# Patient Record
Sex: FEMALE | Race: WHITE | Employment: OTHER | URBAN - METROPOLITAN AREA
[De-identification: names, ages, dates, MRNs, and addresses within clinical notes are randomized per-mention and may not be internally consistent; named-entity substitution may affect disease eponyms.]

---

## 2017-06-20 ENCOUNTER — HOSPITAL ENCOUNTER (OUTPATIENT)
Dept: SURGERY | Age: 67
Discharge: HOME | End: 2017-06-20
Attending: INTERNAL MEDICINE | Admitting: INTERNAL MEDICINE

## 2017-06-20 VITALS
HEART RATE: 52 BPM | HEIGHT: 66 IN | WEIGHT: 165 LBS | SYSTOLIC BLOOD PRESSURE: 121 MMHG | RESPIRATION RATE: 16 BRPM | TEMPERATURE: 98.8 F | OXYGEN SATURATION: 94 % | BODY MASS INDEX: 26.52 KG/M2 | DIASTOLIC BLOOD PRESSURE: 72 MMHG

## 2017-06-20 DIAGNOSIS — Z86.010 HISTORY OF COLONIC POLYPS: ICD-10-CM

## 2017-06-20 RX ORDER — ATENOLOL 50 MG/1
100 TABLET ORAL DAILY
COMMUNITY

## 2017-06-20 RX ORDER — LIDOCAINE HYDROCHLORIDE 10 MG/ML
0.1 INJECTION, SOLUTION EPIDURAL; INFILTRATION; INTRACAUDAL; PERINEURAL
Status: ACTIVE | OUTPATIENT
Start: 2017-06-20 | End: 2017-06-20

## 2017-06-20 RX ORDER — SODIUM CHLORIDE, SODIUM LACTATE, POTASSIUM CHLORIDE, CALCIUM CHLORIDE 600; 310; 30; 20 MG/100ML; MG/100ML; MG/100ML; MG/100ML
INJECTION, SOLUTION INTRAVENOUS ONCE
Status: COMPLETED | OUTPATIENT
Start: 2017-06-20 | End: 2017-06-20

## 2017-06-20 RX ORDER — VALSARTAN 320 MG/1
320 TABLET ORAL DAILY
COMMUNITY
End: 2019-04-11

## 2017-06-20 RX ADMIN — SODIUM CHLORIDE, SODIUM LACTATE, POTASSIUM CHLORIDE, CALCIUM CHLORIDE: 600; 310; 30; 20 INJECTION, SOLUTION INTRAVENOUS at 08:51

## 2017-06-20 ASSESSMENT — PAIN SCALES - GENERAL
PAINLEVEL_OUTOF10: 0
PAINLEVEL_OUTOF10: 0

## 2017-06-20 ASSESSMENT — PAIN - FUNCTIONAL ASSESSMENT: PAIN_FUNCTIONAL_ASSESSMENT: 0-10

## 2018-02-16 ENCOUNTER — OFFICE VISIT (OUTPATIENT)
Dept: ORTHOPEDIC SURGERY | Age: 68
End: 2018-02-16

## 2018-02-16 VITALS
WEIGHT: 162 LBS | HEART RATE: 57 BPM | DIASTOLIC BLOOD PRESSURE: 90 MMHG | HEIGHT: 66 IN | SYSTOLIC BLOOD PRESSURE: 151 MMHG | BODY MASS INDEX: 26.03 KG/M2

## 2018-02-16 DIAGNOSIS — S76.011A STRAIN OF GLUTEUS MEDIUS, RIGHT, INITIAL ENCOUNTER: ICD-10-CM

## 2018-02-16 DIAGNOSIS — M25.551 RIGHT HIP PAIN: Primary | ICD-10-CM

## 2018-02-16 PROCEDURE — G8419 CALC BMI OUT NRM PARAM NOF/U: HCPCS | Performed by: ORTHOPAEDIC SURGERY

## 2018-02-16 PROCEDURE — G8400 PT W/DXA NO RESULTS DOC: HCPCS | Performed by: ORTHOPAEDIC SURGERY

## 2018-02-16 PROCEDURE — 1123F ACP DISCUSS/DSCN MKR DOCD: CPT | Performed by: ORTHOPAEDIC SURGERY

## 2018-02-16 PROCEDURE — 1090F PRES/ABSN URINE INCON ASSESS: CPT | Performed by: ORTHOPAEDIC SURGERY

## 2018-02-16 PROCEDURE — G8484 FLU IMMUNIZE NO ADMIN: HCPCS | Performed by: ORTHOPAEDIC SURGERY

## 2018-02-16 PROCEDURE — 4040F PNEUMOC VAC/ADMIN/RCVD: CPT | Performed by: ORTHOPAEDIC SURGERY

## 2018-02-16 PROCEDURE — 3014F SCREEN MAMMO DOC REV: CPT | Performed by: ORTHOPAEDIC SURGERY

## 2018-02-16 PROCEDURE — 3017F COLORECTAL CA SCREEN DOC REV: CPT | Performed by: ORTHOPAEDIC SURGERY

## 2018-02-16 PROCEDURE — 1036F TOBACCO NON-USER: CPT | Performed by: ORTHOPAEDIC SURGERY

## 2018-02-16 PROCEDURE — 99203 OFFICE O/P NEW LOW 30 MIN: CPT | Performed by: ORTHOPAEDIC SURGERY

## 2018-02-16 PROCEDURE — G8427 DOCREV CUR MEDS BY ELIG CLIN: HCPCS | Performed by: ORTHOPAEDIC SURGERY

## 2018-02-16 NOTE — PROGRESS NOTES
patient's allergies indicates no known allergies. Medications:  Outpatient Prescriptions Marked as Taking for the 2/16/18 encounter (Office Visit) with Kendrick Lewis MD   Medication Sig Dispense Refill    valsartan (DIOVAN) 320 MG tablet Take 320 mg by mouth daily      atenolol (TENORMIN) 50 MG tablet Take 50 mg by mouth daily      Ascorbic Acid (VITAMIN C) 500 MG tablet Take 500 mg by mouth daily.  fish oil-omega-3 fatty acids 1000 MG capsule Take 2 g by mouth daily.  calcium carbonate (TUMS) 500 MG chewable tablet Take 1 tablet by mouth daily.  aspirin 81 MG EC tablet Take 81 mg by mouth daily.  Multiple Vitamins-Minerals (MULTI FOR HER 50+ PO) Take 1 tablet by mouth daily.  vitamin D (CHOLECALCIFEROL) 400 UNIT TABS tablet Take 400 Units by mouth daily.  Olmesartan Medoxomil (BENICAR PO) Take 40 mg by mouth daily. Social History     Social History    Marital status:      Spouse name: N/A    Number of children: 0    Years of education: N/A     Occupational History          Social History Main Topics    Smoking status: Never Smoker    Smokeless tobacco: Never Used    Alcohol use 0.5 oz/week     1 Standard drinks or equivalent per week    Drug use: No    Sexual activity: Not on file     Other Topics Concern    Not on file     Social History Narrative    No narrative on file     Family History   Problem Relation Age of Onset    Cancer Father        Review of Systems:    [employer] reported review of systems has been reviewed from 02/16/18 and is in her medical record. The scanned image can be found in media images folder. She was instructed to contact her primary care physician regarding ROS positives if not already being addressed during today's visit.     Objective:   Physical Exam  Vital Signs:  BP (!) 151/90   Pulse 57   Ht 5' 6\" (1.676 m)   Wt 162 lb (73.5 kg)   BMI 26.15 kg/m²     Constitution:  Generally, athletic pubalgia   [] Resisted curl up for athletic pubalgia     Motor Function:  [x] No gross motor weakness of hip [x] No gross motor weakness of knee  [x] No gross motor weakness of ankle    [x] No gross motor weakness of great toe    [] Motor strength:   [x] Hip Flex [x] 5/5 [] 4/5 [] 3/5 [] 2/5 [] 1/5 [] 0/5   [x] Hip ABductors [x] 5/5 [] 4/5 [] 3/5 [] 2/5 [] 1/5 [] 0/5   [x] Hip ADductors [x] 5/5 [] 4/5 [] 3/5 [] 2/5 [] 1/5 [] 0/5     Neurologic:   [x] Sensation to light touch intact  [x] Coordination / proprioception intact  Motor function intact L2-S1    Circulation:  [x] The limb is warm and well perfused. [x] Capillary refill is intact. [] Edema:  [x] none  [] mild  [] moderate  [] severe     Assessment of Joint Laxity:    Beighton hypermobility score (0-9): 0  [] Small fingers passively able to extend beyond 90 degrees (2 pts)   [] Thumbs passively able to flex to flexor aspect of forearm (2 pts)   [] Elbows hyperextend beyond 10 degrees (2 pts)   [] Knees hyperextend beyond 10 degrees (2 pts)   [] Can rest palms on floor with forward flexion of trunk with knees extended (1 pt)          RIGHT HIP ORTHOPAEDIC  EXAM:  Inspection:  [x] Skin intact without abrasion, lacerations or rashes  [x] Leg lengths equal  [] Ecchymosis:  [x] none  [] mild  [] moderate  [] severe   [] Atrophy:  [x] none  [] mild  [] moderate  [] severe      Range of Motion:  [x] No flexion contracture         [] Deferred: acute injury/post-surgery/pain  [] Flexion contracture     Forward flexion: 110  Supine Internal rotation: 25  Supine External rotation: 65  Abduction: 50  Adduction: 30      Palpation:   Tenderness over the gluteal musculature and posterior flare of the greater trochanter. Pain with resisted abduction. Negative trendelenburg gait and sign.     Provocative Tests:  [x] Negative  Positive Tests:  [] Log Roll   [] Anabela Test: ITB Tightness   [] FADIR Anterior impingement:    [] Posterior Impingement    [] Shuck test for symptoms appear prior to the next scheduled visit. She is specifically instructed to contact the office between now & her scheduled appointment if she has concerns related to her condition or if she needs assistance in scheduling the above tests. She is welcome to call for an appointment sooner if she has any additional concerns or questions. Patient Education Materials Provided:  [x] Dr Ronaldo Greenfield: New patient folder,  Anatomic Drawings and treatment algorithms    There are no Patient Instructions on file for this visit. Orders Placed This Encounter   Procedures    XR HIP RIGHT (2-3 VIEWS)     [id number]     Order Specific Question:   Reason for exam:     Answer:   Pain, ap and dun, room 4    [facility]     Referral Priority:   Routine     Referral Type:   Eval and Treat     Referral Reason:   Specialty Services Required     Requested Specialty:   Physical Therapy     Number of Visits Requested:   1       Refills/New Prescriptions:  No orders of the defined types were placed in this encounter. Today's prescription medications will be e-scribed (when appropriate) to the Patient's Preferred Pharmacy:   [place] [address]  Phone: [phone] Fax: [phone]    [facility] [address] [place] [address], [address]  [address] [place] [address]  Phone: [phone] Fax: [phone]       Karen Blount MD  Clinical Fellow in Sports Medicine  Board Certified Orthopaedic Surgeon  [facility]    I attest that my visit today with Devon Carlson was supervised by Dr Tim Navarro:  ([place] [address] [phone])  [facility] main number: use after hours [phone])    This dictation was performed with a verbal recognition program (DRAGON) and it was checked for errors.   It is possible that there are still dictated errors within this office note. If so, please bring any errors to my attention for an addendum. All efforts were made to ensure that this office note is accurate. Attending Attestation Note:    I, Dr. Aggie Montgomery MD, personally performed the services described in this documentation as scribed by Dr. Beckie rGay M.D. in my presence, and it is both accurate and complete.       Aggie Montgomery MD  Orthopaedic Surgeon, Sports Medicine  Director, Hip Arthroscopy and [address] and [employer] [place] () - [phone]

## 2018-02-16 NOTE — LETTER
Hip Rehabilitation Referral    Patient Name: Apoorva Diaz      YOB: 1950    Diagnosis: Right Abductor strain / Hip pain     Precautions: N/A    Evaluate and Treat          Stretching and soft tissue mobs:  Strengthening:   IT Band      Core stabilization   Adductors      Glute eccentric progressing to concentric   Hip Flexors      Pelvic and trunk strengthening   Gluteals      Abductors      Iliopsoas complex     Flexors   Rectus femoris      Progressive resistive exercises   TFL         Sartorius                     Activities:        Kinematic Retraining        Activity modification      Patient education to avoid continued irritation with ADLs   Normalization of gait    Modalities:     Return to Sport:   Ultrasound      Plyometrics   Iontophoresis     Rhythmic stabilization   Moist heat      Core strengthening    Massage      Sports specific program:       Cryotherapy       Electrical stimulation      Paraffin   Whirlpool   TENS   Per therapist discretion   Home exercise program (copy to patient). Perform exercises for:  30    minutes   2- 3      times/day   Supervised physical therapy  Frequency:   1x week   2x week   3x week   Other:   Duration:  2 weeks    4 weeks   6 weeks   Other:     Additional Instructions:       Hip pain   Focus on core stabilization, glute & eccentric hip flexor strengthening progressing to concentric. Soft tissue mobs focus on hip adductors, rectus femoris, iliopsoas, TFL, & gluteals. Kinematic re-training and activity modification as indicated. Trochanteric Bursitis/Glutes Medius Tendinitis/tendinopathy:   Soft tissue mobs to Glutes,  Adductors,  ITB, primary and secondary hip flexors. Core stabilization, pelvic and trunk control exercise, Glute strengthening. Activity modification and Pt education to avoid continued irritation with ADLs. Normalize of gait. AVOID LONG LEVER SIDE or STRAIGHT LEG RAISE.

## 2018-02-21 ENCOUNTER — HOSPITAL ENCOUNTER (OUTPATIENT)
Dept: PHYSICAL THERAPY | Age: 68
Discharge: HOME | End: 2018-02-28
Admitting: ORTHOPAEDIC SURGERY

## 2018-02-21 NOTE — FLOWSHEET NOTE
bilaterally to allow for proper functional mobility as indicated by patients Functional Deficits. 4. Patient will return to walking and biking without increased symptoms or restriction. 5. Patient will be able to ascend/descend full flight of stairs with reciprocal gait pattern and no increase in symptoms. New or Updated Goals (if applicable):  [x] No change to goals established upon initial eval/last progress note:  New Goals:    Progression Towards Functional goals:   [] Patient is progressing as expected towards functional goals listed. [] Progression is slowed due to complexities listed. [] Progression has been slowed due to co-morbidities.   [x] Plan just implemented, too soon to assess goals progression  [] Other:     ASSESSMENT:    [] Improvement noted relative to goals:  [] No Improvement noted related to goals:  Summary/Patient's response to treatment: See Eval    Treatment/Activity Tolerance:  [x] Patient tolerated treatment well [] Patient limited by fatique  [] Patient limited by pain  [] Patient limited by other medical complications  [] Other:     Prognosis: [x] Good [] Fair  [] Poor    Patient Requires Follow-up: [x] Yes  [] No    PLAN: See eval  [] Continue per plan of care [] Alter current plan (see comments)  [x] Plan of care initiated [] Hold pending MD visit [] Discharge    Electronically signed by: Madeline Peñaloza PT

## 2018-02-21 NOTE — PLAN OF CARE
am and after sitting for long periods of time but loosens up as she moves more. Relevant Medical History:   Functional Disability Index:PT G-Codes  Functional Assessment Tool Used: LEFS  Score: 34%  Functional Limitation: Mobility: Walking and moving around  Mobility: Walking and Moving Around Current Status (): At least 20 percent but less than 40 percent impaired, limited or restricted  Mobility: Walking and Moving Around Goal Status ():  At least 1 percent but less than 20 percent impaired, limited or restricted    Pain Scale: 8 with hike, currently 6/10  Easing factors: rest, stretching  Provocative factors: stairs, hiking, increased activity     Type: [x]Constant   []Intermittent  []Radiating []Localized []other:     Numbness/Tingling: none    Occupation/School: recently retired    Living Status/Prior Level of Function: Independent with ADLs and IADLs, biking, exercise    OBJECTIVE:     ROM LEFT RIGHT   HIP Flex WNL WNL   HIP Abd     HIP Ext     HIP IR Equal bilaterally    HIP ER Equal bilaterally    Knee ext     Knee Flex     Ankle PF     Ankle DF     Ankle In     Ankle Ev     Strength  LEFT RIGHT   HIP Flexors 4- with pain 4+   HIP Abductors 4- with pain 4-   HIP Ext 4- with pain 4+   Hip ER     Knee EXT (quad)     Knee Flex (HS)     Ankle DF     Ankle PF     Ankle Inv     Ankle EV          Circumference  Mid apex  7 cm prox             Reflexes/Sensation:    []Dermatomes/Myotomes intact    [x]Reflexes equal and normal bilaterally   []Other:    Joint mobility:    [x]Normal    []Hypo   []Hyper    Palpation: TTP over glut med muscle belly, SI jt, glut med origin and insertion, slight TTP over lateral quad and ITB    Functional Mobility/Transfers: independent    Posture: slight decrease in WB on R LE , anterior pelvic tilt    Bandages/Dressings/Incisions: n/a    Gait: (include devices/WB status) Slight antalgic gait on R LE    Orthopedic Special Tests: negative christian, HS length equal bilaterally, negative scour                       [x] Patient history, allergies, meds reviewed. Medical chart reviewed. See intake form. Review Of Systems (ROS):  [x]Performed Review of systems (Integumentary, CardioPulmonary, Neurological) by intake and observation. Intake form has been scanned into medical record. Patient has been instructed to contact their primary care physician regarding ROS issues if not already being addressed at this time. Co-morbidities/Complexities (which will affect course of rehabilitation):   []None           Arthritic conditions   []Rheumatoid arthritis (M05.9)  [x]Osteoarthritis (M19.91)   Cardiovascular conditions   [x]Hypertension (I10)  []Hyperlipidemia (E78.5)  []Angina pectoris (I20)  []Atherosclerosis (I70)   Musculoskeletal conditions   []Disc pathology   []Congenital spine pathologies   []Prior surgical intervention  []Osteoporosis (M81.8)  []Osteopenia (M85.8)   Endocrine conditions   []Hypothyroid (E03.9)  []Hyperthyroid Gastrointestinal conditions   []Constipation (A26.26)   Metabolic conditions   []Morbid obesity (E66.01)  []Diabetes type 1(E10.65) or 2 (E11.65)   []Neuropathy (G60.9)     Pulmonary conditions   []Asthma (J45)  []Coughing   []COPD (J44.9)   Psychological Disorders  [x]Anxiety (F41.9)  [x]Depression (F32.9)   []Other:   []Other:          Barriers to/and or personal factors that will affect rehab potential:              [x]Age  []Sex              []Motivation/Lack of Motivation                        []Co-Morbidities              []Cognitive Function, education/learning barriers              []Environmental, home barriers              []profession/work barriers  []past PT/medical experience  []other:  Justification:    Falls Risk Assessment (30 days):   [x] Falls Risk assessed and no intervention required.   [] Falls Risk assessed and Patient requires intervention due to being higher risk   TUG score (>12s at risk):     [] Falls education provided, including

## 2018-02-28 ENCOUNTER — HOSPITAL ENCOUNTER (OUTPATIENT)
Dept: PHYSICAL THERAPY | Age: 68
Discharge: HOME | End: 2018-03-01

## 2018-02-28 NOTE — FLOWSHEET NOTE
HEP   Prone ext 2 x 10    BLAINE ext/ABD 2 x 10    Forward step ups 2 x 10    Glider back, diagonal 1 x 10                                  Manual Intervention     Hip PROM 2 min    STM/cross friction ITB, lateral quad, glut med, SI joint 8 min                        NMR re-education                                                      Therapeutic Exercise and NMR EXR  [x] (09701) Provided verbal/tactile cueing for activities related to strengthening, flexibility, endurance, ROM for improvements in LE, proximal hip, and core control with self care, mobility, lifting, ambulation.  [] (14586) Provided verbal/tactile cueing for activities related to improving balance, coordination, kinesthetic sense, posture, motor skill, proprioception  to assist with LE, proximal hip, and core control in self care, mobility, lifting, ambulation and eccentric single leg control.      NMR and Therapeutic Activities:    [x] (43640 or 25199) Provided verbal/tactile cueing for activities related to improving balance, coordination, kinesthetic sense, posture, motor skill, proprioception and motor activation to allow for proper function of core, proximal hip and LE with self care and ADLs  [] (53129) Gait Re-education- Provided training and instruction to the patient for proper LE, core and proximal hip recruitment and positioning and eccentric body weight control with ambulation re-education including up and down stairs     Home Exercise Program:    [x] (69173) Reviewed/Progressed HEP activities related to strengthening, flexibility, endurance, ROM of core, proximal hip and LE for functional self-care, mobility, lifting and ambulation/stair navigation   [] (01871)Reviewed/Progressed HEP activities related to improving balance, coordination, kinesthetic sense, posture, motor skill, proprioception of core, proximal hip and LE for self care, mobility, lifting, and ambulation/stair navigation      Manual Treatments:  PROM / STM /

## 2018-03-01 ENCOUNTER — HOSPITAL ENCOUNTER (OUTPATIENT)
Dept: PHYSICAL THERAPY | Age: 68
Discharge: HOME | End: 2018-03-31
Attending: ORTHOPAEDIC SURGERY | Admitting: ORTHOPAEDIC SURGERY

## 2018-03-07 ENCOUNTER — HOSPITAL ENCOUNTER (OUTPATIENT)
Dept: PHYSICAL THERAPY | Age: 68
Discharge: HOME | End: 2018-03-08

## 2018-03-07 NOTE — FLOWSHEET NOTE
[facility] [address] [facility], [address]  Phone: [phone]  Fax [phone]    Physical Therapy Daily Treatment Note  Date:  3/7/2018    Patient Name:  Rosina Boyd    :  1950  MRN: [id number]  Restrictions/Precautions:    Physician Information:  Referring Practitioner: Dr. Christen Jimenez  Medical/Treatment Diagnosis Information:  Diagnosis: R80.173M (ICD-10-CM) - Strain of gluteus medius, right, initial encounter  · Treatment Diagnosis:  Right hip pain M25.551,  Right hip stiffness M25.651,  Right LE weakness M62.81                                         [x] Conservative / [] Surgical - DOS:  Therapy Diagnosis/Practice Pattern:  Practice Pattern A: Primary Prevention  Insurance/Certification information:  PT Insurance Information: Medicare  Plan of care signed: [x] YES  [] NO  Number of Comorbidities:  []0     [x]1-2    []3+  Date of Patient follow up with Physician:     G-Code (if applicable):      Date G-Code Applied:         Progress Note: [x]  Yes  []  No  Next due by: Visit #10        Latex Allergy:  [x]NO      []YES  Preferred Language for Healthcare:   [x]English       []other:    Visit # Insurance Allowable Reporting Period   3 medicare Begin Date: 3/7/2018               End Date:      RECERT DUE BY:     SUBJECTIVE: Pt doing much better. Was able to go on walk for 45 mins one day and 60 mins another day with no increase in pain. Slowly working towards hills.     OBJECTIVE:   Observation:  Palpation: TTP over ITB, glut med, hip flexor     Test used Initial score Current Score   Pain Summary VAS     Functional questionnaire LEFS     ROM IR/ER  equal bilaterally     flexion WNL    Strength Hip flexors 4- with pain     ABD 4- with pain     ext 4- with pain         RESTRICTIONS/PRECAUTIONS: none    Exercises/Interventions:     Therapeutic Ex Sets/reps Notes   Supine piriformis stretch 3 x 30 HEP   ITB strap stretch 3 x 30

## 2018-03-21 ENCOUNTER — HOSPITAL ENCOUNTER (OUTPATIENT)
Dept: PHYSICAL THERAPY | Age: 68
Discharge: HOME | End: 2018-03-22

## 2018-03-21 NOTE — FLOWSHEET NOTE
[facility] [address] [facility], [address]  Phone: [phone]  Fax [phone]    Physical Therapy Daily Treatment Note  Date:  3/21/2018    Patient Name:  Christine Cooks    :  1950  MRN: [id number]  Restrictions/Precautions:    Physician Information:  Referring Practitioner: Dr. Taisha Perry  Medical/Treatment Diagnosis Information:  Diagnosis: N91.491J (ICD-10-CM) - Strain of gluteus medius, right, initial encounter  · Treatment Diagnosis:  Right hip pain M25.551,  Right hip stiffness M25.651,  Right LE weakness M62.81                                         [x] Conservative / [] Surgical - DOS:  Therapy Diagnosis/Practice Pattern:  Practice Pattern A: Primary Prevention  Insurance/Certification information:  PT Insurance Information: Medicare  Plan of care signed: [x] YES  [] NO  Number of Comorbidities:  []0     [x]1-2    []3+  Date of Patient follow up with Physician:     G-Code (if applicable):      Date G-Code Applied:         Progress Note: [x]  Yes  []  No  Next due by: Visit #10        Latex Allergy:  [x]NO      []YES  Preferred Language for Healthcare:   [x]English       []other:    Visit # Insurance Allowable Reporting Period   4 medicare Begin Date: 3/21/2018               End Date:      RECERT DUE BY:     SUBJECTIVE: Pt doing much better, went for 1 hour walk two times this week with no issues. Pt feeling much better, can still feel it a little bit if she stretches far enough. Going to try hiking this weekend.     OBJECTIVE:   Observation:  Palpation: TTP over ITB, glut med, hip flexor     Test used Initial score Current Score   Pain Summary VAS     Functional questionnaire LEFS     ROM IR/ER  equal bilaterally     flexion WNL    Strength Hip flexors 4- with pain     ABD 4- with pain     ext 4- with pain         RESTRICTIONS/PRECAUTIONS: none    Exercises/Interventions:     Therapeutic Ex Sets/reps Notes   Supine piriformis stretch 3 x 30 HEP   ITB strap stretch 3 x 30 HEP   LTR with SB 2 x 10 HEP   Bridges/on SB 1 x 10 HEP   Clamshells 2 x 10 OTB HEP   SLR 2 x 10  1.5 #HEP   Prone ext 2 x 10    BLAINE ext/ABD 2 x 10 45#   Forward step ups 2 x 10 6 inch with foam   Glider back, diagonal 1 x 10    SAQ/LAQ 2 x 10 Added to HEP 1.5#    Bike 5 min    Three ways band at ankle on box 15x Added to HEP [facility]                  Manual Intervention     Hip PROM 2 min    STM/cross friction ITB, lateral quad, glut med, SI joint 8 min                        NMR re-education                                                      Therapeutic Exercise and NMR EXR  [x] (19132) Provided verbal/tactile cueing for activities related to strengthening, flexibility, endurance, ROM for improvements in LE, proximal hip, and core control with self care, mobility, lifting, ambulation.  [] (20473) Provided verbal/tactile cueing for activities related to improving balance, coordination, kinesthetic sense, posture, motor skill, proprioception  to assist with LE, proximal hip, and core control in self care, mobility, lifting, ambulation and eccentric single leg control.      NMR and Therapeutic Activities:    [x] (72279 or 96384) Provided verbal/tactile cueing for activities related to improving balance, coordination, kinesthetic sense, posture, motor skill, proprioception and motor activation to allow for proper function of core, proximal hip and LE with self care and ADLs  [] (42424) Gait Re-education- Provided training and instruction to the patient for proper LE, core and proximal hip recruitment and positioning and eccentric body weight control with ambulation re-education including up and down stairs     Home Exercise Program:    [x] (83085) Reviewed/Progressed HEP activities related to strengthening, flexibility, endurance, ROM of core, proximal hip and LE for functional self-care, mobility, lifting and ambulation/stair navigation   [] strength and control, at least 4+ hip ABD bilaterally to allow for proper functional mobility as indicated by patients Functional Deficits. 4. Patient will return to walking and biking without increased symptoms or restriction. 5. Patient will be able to ascend/descend full flight of stairs with reciprocal gait pattern and no increase in symptoms. New or Updated Goals (if applicable):  [x] No change to goals established upon initial eval/last progress note:  New Goals:    Progression Towards Functional goals:   [x] Patient is progressing as expected towards functional goals listed. [] Progression is slowed due to complexities listed. [] Progression has been slowed due to co-morbidities.   [] Plan just implemented, too soon to assess goals progression  [] Other:     ASSESSMENT:    [] Improvement noted relative to goals:  [] No Improvement noted related to goals:  Summary/Patient's response to treatment: Improved mechanics with single leg activities    Treatment/Activity Tolerance:  [x] Patient tolerated treatment well [] Patient limited by fatique  [] Patient limited by pain  [] Patient limited by other medical complications  [] Other:     Prognosis: [x] Good [] Fair  [] Poor    Patient Requires Follow-up: [x] Yes  [] No    PLAN: See eval  [x] Continue per plan of care [] Alter current plan (see comments)  [] Plan of care initiated [] Hold pending MD visit [] Discharge    Electronically signed by: Monae Flores PT

## 2018-04-01 ENCOUNTER — HOSPITAL ENCOUNTER (OUTPATIENT)
Dept: PHYSICAL THERAPY | Age: 68
Discharge: HOME | End: 2018-04-30
Attending: ORTHOPAEDIC SURGERY | Admitting: ORTHOPAEDIC SURGERY

## 2018-04-04 ENCOUNTER — HOSPITAL ENCOUNTER (OUTPATIENT)
Dept: PHYSICAL THERAPY | Age: 68
Discharge: HOME | End: 2018-04-05

## 2018-05-01 ENCOUNTER — HOSPITAL ENCOUNTER (OUTPATIENT)
Dept: PHYSICAL THERAPY | Age: 68
Discharge: HOME | End: 2018-05-31
Attending: ORTHOPAEDIC SURGERY | Admitting: ORTHOPAEDIC SURGERY

## 2019-04-11 ENCOUNTER — HOSPITAL ENCOUNTER (EMERGENCY)
Age: 69
Discharge: HOME | End: 2019-04-11
Attending: EMERGENCY MEDICINE
Payer: MEDICARE

## 2019-04-11 ENCOUNTER — APPOINTMENT (OUTPATIENT)
Dept: GENERAL RADIOLOGY | Age: 69
End: 2019-04-11
Payer: MEDICARE

## 2019-04-11 ENCOUNTER — APPOINTMENT (OUTPATIENT)
Dept: CT IMAGING | Age: 69
End: 2019-04-11
Payer: MEDICARE

## 2019-04-11 VITALS
RESPIRATION RATE: 14 BRPM | WEIGHT: 157 LBS | HEART RATE: 64 BPM | TEMPERATURE: 99 F | HEIGHT: 66 IN | OXYGEN SATURATION: 100 % | SYSTOLIC BLOOD PRESSURE: 148 MMHG | BODY MASS INDEX: 25.23 KG/M2 | DIASTOLIC BLOOD PRESSURE: 88 MMHG

## 2019-04-11 DIAGNOSIS — M79.18 MUSCULOSKELETAL PAIN: ICD-10-CM

## 2019-04-11 DIAGNOSIS — M54.9 UPPER BACK PAIN ON LEFT SIDE: Primary | ICD-10-CM

## 2019-04-11 LAB
A/G RATIO: 1.9 (ref 1.1–2.2)
ALBUMIN SERPL-MCNC: 4.1 G/DL (ref 3.4–5)
ALP BLD-CCNC: 63 U/L (ref 40–129)
ALT SERPL-CCNC: 10 U/L (ref 10–40)
ANION GAP SERPL CALCULATED.3IONS-SCNC: 9 MMOL/L (ref 3–16)
AST SERPL-CCNC: 21 U/L (ref 15–37)
BASOPHILS ABSOLUTE: 0.1 K/UL (ref 0–0.2)
BASOPHILS RELATIVE PERCENT: 1.2 %
BILIRUB SERPL-MCNC: 0.4 MG/DL (ref 0–1)
BILIRUBIN URINE: NEGATIVE
BLOOD, URINE: NEGATIVE
BUN BLDV-MCNC: 14 MG/DL (ref 7–20)
CALCIUM SERPL-MCNC: 8.8 MG/DL (ref 8.3–10.6)
CHLORIDE BLD-SCNC: 96 MMOL/L (ref 99–110)
CLARITY: CLEAR
CO2: 29 MMOL/L (ref 21–32)
COLOR: YELLOW
CREAT SERPL-MCNC: 0.6 MG/DL (ref 0.6–1.2)
D DIMER: <200 NG/ML DDU (ref 0–229)
EKG ATRIAL RATE: 61 BPM
EKG DIAGNOSIS: NORMAL
EKG P AXIS: 48 DEGREES
EKG P-R INTERVAL: 180 MS
EKG Q-T INTERVAL: 428 MS
EKG QRS DURATION: 92 MS
EKG QTC CALCULATION (BAZETT): 430 MS
EKG R AXIS: 43 DEGREES
EKG T AXIS: 32 DEGREES
EKG VENTRICULAR RATE: 61 BPM
EOSINOPHILS ABSOLUTE: 0.3 K/UL (ref 0–0.6)
EOSINOPHILS RELATIVE PERCENT: 6.3 %
GFR AFRICAN AMERICAN: >60
GFR NON-AFRICAN AMERICAN: >60
GLOBULIN: 2.2 G/DL
GLUCOSE BLD-MCNC: 101 MG/DL (ref 70–99)
GLUCOSE URINE: NEGATIVE MG/DL
HCT VFR BLD CALC: 35.9 % (ref 36–48)
HEMOGLOBIN: 12.3 G/DL (ref 12–16)
KETONES, URINE: NEGATIVE MG/DL
LEUKOCYTE ESTERASE, URINE: NEGATIVE
LYMPHOCYTES ABSOLUTE: 1.3 K/UL (ref 1–5.1)
LYMPHOCYTES RELATIVE PERCENT: 24.6 %
MCH RBC QN AUTO: 30.3 PG (ref 26–34)
MCHC RBC AUTO-ENTMCNC: 34.2 G/DL (ref 31–36)
MCV RBC AUTO: 88.7 FL (ref 80–100)
MICROSCOPIC EXAMINATION: NORMAL
MONOCYTES ABSOLUTE: 0.4 K/UL (ref 0–1.3)
MONOCYTES RELATIVE PERCENT: 6.6 %
NEUTROPHILS ABSOLUTE: 3.3 K/UL (ref 1.7–7.7)
NEUTROPHILS RELATIVE PERCENT: 61.3 %
NITRITE, URINE: NEGATIVE
PDW BLD-RTO: 13.7 % (ref 12.4–15.4)
PH UA: 7 (ref 5–8)
PLATELET # BLD: 276 K/UL (ref 135–450)
PMV BLD AUTO: 6.9 FL (ref 5–10.5)
POTASSIUM SERPL-SCNC: 3.8 MMOL/L (ref 3.5–5.1)
PROTEIN UA: NEGATIVE MG/DL
RBC # BLD: 4.05 M/UL (ref 4–5.2)
SODIUM BLD-SCNC: 134 MMOL/L (ref 136–145)
SPECIFIC GRAVITY UA: <=1.005 (ref 1–1.03)
TOTAL PROTEIN: 6.3 G/DL (ref 6.4–8.2)
TROPONIN: <0.01 NG/ML
URINE REFLEX TO CULTURE: NORMAL
URINE TYPE: NORMAL
UROBILINOGEN, URINE: 0.2 E.U./DL
WBC # BLD: 5.4 K/UL (ref 4–11)

## 2019-04-11 PROCEDURE — 85379 FIBRIN DEGRADATION QUANT: CPT

## 2019-04-11 PROCEDURE — 85025 COMPLETE CBC W/AUTO DIFF WBC: CPT

## 2019-04-11 PROCEDURE — 99285 EMERGENCY DEPT VISIT HI MDM: CPT

## 2019-04-11 PROCEDURE — 93005 ELECTROCARDIOGRAM TRACING: CPT | Performed by: EMERGENCY MEDICINE

## 2019-04-11 PROCEDURE — 6360000002 HC RX W HCPCS: Performed by: NURSE PRACTITIONER

## 2019-04-11 PROCEDURE — 96374 THER/PROPH/DIAG INJ IV PUSH: CPT

## 2019-04-11 PROCEDURE — 81003 URINALYSIS AUTO W/O SCOPE: CPT

## 2019-04-11 PROCEDURE — 73030 X-RAY EXAM OF SHOULDER: CPT

## 2019-04-11 PROCEDURE — 6360000004 HC RX CONTRAST MEDICATION: Performed by: EMERGENCY MEDICINE

## 2019-04-11 PROCEDURE — 80053 COMPREHEN METABOLIC PANEL: CPT

## 2019-04-11 PROCEDURE — 71260 CT THORAX DX C+: CPT

## 2019-04-11 PROCEDURE — 84484 ASSAY OF TROPONIN QUANT: CPT

## 2019-04-11 PROCEDURE — 71046 X-RAY EXAM CHEST 2 VIEWS: CPT

## 2019-04-11 PROCEDURE — 93010 ELECTROCARDIOGRAM REPORT: CPT | Performed by: INTERNAL MEDICINE

## 2019-04-11 RX ORDER — LOSARTAN POTASSIUM 25 MG/1
25 TABLET ORAL DAILY
COMMUNITY

## 2019-04-11 RX ORDER — KETOROLAC TROMETHAMINE 30 MG/ML
30 INJECTION, SOLUTION INTRAMUSCULAR; INTRAVENOUS ONCE
Status: COMPLETED | OUTPATIENT
Start: 2019-04-11 | End: 2019-04-11

## 2019-04-11 RX ORDER — METHOCARBAMOL 500 MG/1
500 TABLET, FILM COATED ORAL 3 TIMES DAILY
Qty: 30 TABLET | Refills: 0 | Status: SHIPPED | OUTPATIENT
Start: 2019-04-11 | End: 2019-04-21

## 2019-04-11 RX ORDER — NAPROXEN 500 MG/1
500 TABLET ORAL 2 TIMES DAILY WITH MEALS
Qty: 30 TABLET | Refills: 0 | Status: SHIPPED | OUTPATIENT
Start: 2019-04-11

## 2019-04-11 RX ORDER — CALCIUM CARBONATE 200(500)MG
1 TABLET,CHEWABLE ORAL DAILY
COMMUNITY

## 2019-04-11 RX ADMIN — IOPAMIDOL 75 ML: 755 INJECTION, SOLUTION INTRAVENOUS at 16:06

## 2019-04-11 RX ADMIN — KETOROLAC TROMETHAMINE 30 MG: 30 INJECTION, SOLUTION INTRAMUSCULAR; INTRAVENOUS at 15:18

## 2019-04-11 ASSESSMENT — PAIN SCALES - GENERAL
PAINLEVEL_OUTOF10: 5
PAINLEVEL_OUTOF10: 5

## 2019-04-11 ASSESSMENT — ENCOUNTER SYMPTOMS
ABDOMINAL PAIN: 0
DIARRHEA: 0
WHEEZING: 0
BACK PAIN: 1
SHORTNESS OF BREATH: 0
VOMITING: 0
NAUSEA: 0
COLOR CHANGE: 0
COUGH: 0

## 2019-04-11 ASSESSMENT — PAIN DESCRIPTION - PROGRESSION: CLINICAL_PROGRESSION: NOT CHANGED

## 2019-04-11 ASSESSMENT — PAIN DESCRIPTION - ONSET: ONSET: ON-GOING

## 2019-04-11 ASSESSMENT — PAIN DESCRIPTION - LOCATION: LOCATION: BACK

## 2019-04-11 ASSESSMENT — PAIN DESCRIPTION - FREQUENCY: FREQUENCY: CONTINUOUS

## 2019-04-11 ASSESSMENT — PAIN DESCRIPTION - PAIN TYPE: TYPE: ACUTE PAIN

## 2019-04-11 ASSESSMENT — PAIN DESCRIPTION - DESCRIPTORS: DESCRIPTORS: ACHING

## 2019-04-11 NOTE — ED PROVIDER NOTES
I independently performed a history and physical on [address] [place] [address]. All diagnostic, treatment, and disposition decisions were made by myself in conjunction with the advanced practice provider. For further details of [address] [facility] emergency department encounter, please see advanced practice provider's documentation    This is a 76-year-old female presenting for evaluation after a fall. Patient fell on Tuesday. She denies having pain initially. However she started having some pain in her upper back near the left shoulder blade for the past couple of days. Physical examination: Lungs clear to auscultation bilaterally. Heart regular rate and rhythm. Pain is not clearly reproducible on examination    The Ekg interpreted by me shows  normal sinus rhythm with a rate of 61  Axis is   Normal  QTc is  normal  Intervals and Durations are unremarkable. ST Segments: no acute change     Xr Chest Standard (2 Vw)    Result Date: 4/11/2019  EXAMINATION: TWO VIEWS OF THE CHEST 4/11/2019 2:05 pm COMPARISON: None. HISTORY: ORDERING SYSTEM PROVIDED HISTORY: SOB TECHNOLOGIST PROVIDED HISTORY: Reason for exam:->SOB Ordering Physician Provided Reason for Exam: chest pain, hurts to take a deep breath Acuity: Unknown Type of Exam: Unknown FINDINGS: Normal cardiac size. Bilateral subcentimeter calcified granuloma are incidentally noted. No evidence of pneumonia, edema or other acute pulmonary process. No evidence of acute process of the cardiac or mediastinal structures. No evidence of pneumothorax or pleural effusion. No evidence of acute cardiopulmonary disease. Xr Shoulder Left (min 2 Views)    Result Date: 4/11/2019  EXAMINATION: 3 XRAY VIEWS OF THE LEFT SHOULDER 4/11/2019 2:05 pm COMPARISON: None.  HISTORY: ORDERING SYSTEM PROVIDED HISTORY: pain TECHNOLOGIST PROVIDED HISTORY: Reason for exam:->pain Ordering Physician Provided Reason for Exam: sharp shoulder pain, no known injury Acuity: Acute Type of Exam: Initial FINDINGS: The bony structures, soft tissues and joints are without acute findings throughout. No fracture demonstrated, and no dislocation. There are moderate degenerative changes of the [facility] joint, glenohumeral joint and elsewhere in the left shoulder. No acute abnormality of the left shoulder. Ct Chest Pulmonary Embolism W Contrast    Result Date: 4/11/2019  EXAMINATION: CTA OF THE CHEST 4/11/2019 3:57 pm TECHNIQUE: CTA of the chest was performed after the administration of intravenous contrast.  Multiplanar reformatted images are provided for review. MIP images are provided for review. Dose modulation, iterative reconstruction, and/or weight based adjustment of the mA/kV was utilized to reduce the radiation dose to as low as reasonably achievable. COMPARISON: None. HISTORY: ORDERING SYSTEM PROVIDED HISTORY: TRAUMA TO TRUNK/THORAX TECHNOLOGIST PROVIDED HISTORY: Ordering Physician Provided Reason for Exam: sharp pain in left shoulder blade starting this morning, SOB- could not take full breath Acuity: Acute Type of Exam: Initial Relevant Medical/Surgical History: no hx of blood clots, no hx of surgery to chest FINDINGS: Pulmonary Arteries: Pulmonary arteries are adequately opacified for evaluation. No pulmonary embolism demonstrated. Main pulmonary artery is normal in caliber. Mediastinum: The heart and pericardium demonstrate no acute abnormality. There is no acute abnormality of the thoracic aorta. Lungs/pleura: No acute process. No focal consolidation or pulmonary edema. No evidence of pleural effusion or pneumothorax. Upper Abdomen: Negative. No acute finding. Soft Tissues/Bones: No acute bone or soft tissue abnormality. No pulmonary embolism or acute pulmonary process demonstrated.         Berna Grade, DO  04/11/19 1940

## 2019-04-11 NOTE — ED PROVIDER NOTES
[facility]  [facility]  eMERGENCY dEPARTMENT eNCOUnter      Pt Name: Dave Interiano  ZJ:[id number]  [place] 1950  Date of evaluation: 4/11/2019  Provider: JUNG Blackmon CNP      Chief Complaint:    Chief Complaint   Patient presents with    Back Pain     Patient having left shoulder blade pain started this am radiating across back and having trouble catching breath. Patient states she thinks she \"pulled a muscle\" but wanted to make sure she wasn't having a heart attack, No chest pain, no nausea, no diaphoresis    Shortness of Breath     Patient having trouble catching breath. Patient states she can recreate the pain in her shoulder when she takes a deep breath in.       Nursing Notes, Past Medical Hx, Past Surgical Hx, Social Hx, Allergies, and Family Hx were all reviewed and agreed with or any disagreements were addressed in the HPI.    HPI:  (Location, Duration, Timing, Severity,Quality, Assoc Sx, Context, Modifying factors)  This is a  78 y.o. female who presents with shoulder blade pain, states pain began radiating across her back that began around 830am. She states she was putting her makeup on. She states that the pain took her breath away, states she was putting her make up on. States that she is not having any chest pain, but feels like she cannot take a full breath because of the pain in the left scapular. She rates the pain a 5 out of 10. Denies taking anything with Tylenol. She denies any head neck or back pain. She does states that she went hiking on Tuesday, states that she did fall, states that she was going across a creek, her left foot slipped on a rock her poles went out and she fell going backward and landing on her back. Denies any cardiac complaints or history. She denies any additional complaints, no additional aggravating or alleviating factors.   The patient presents awake, alert and in no acute distress or toxic as noted above in the ROS, problem specific ROS was completed and is negative. Physical Exam:  Physical Exam   Constitutional: She is oriented to person, place, and time. She appears well-developed and well-nourished. HENT:   Head: Normocephalic. Right Ear: External ear normal.   Left Ear: External ear normal.   Mouth/Throat: Oropharynx is clear and moist.   Eyes: Right eye exhibits no discharge. Left eye exhibits no discharge. No scleral icterus. Neck: Normal range of motion. Neck supple. Cardiovascular: Normal rate and normal heart sounds. Pulmonary/Chest: Effort normal. No respiratory distress. Airway patent with symmetric rise and fall chest, lungs are clear anteriorly and posteriorly, the patient is not tachypneic or dyspneic and saturations are 100% on room air she does have pain behind the left scapula however is not reproducible during palpation. Abdominal: Soft. Musculoskeletal: Normal range of motion. Neurological: She is alert and oriented to person, place, and time. GCS eye subscore is 4. GCS verbal subscore is 5. GCS motor subscore is 6. Although the patient states she fell she did not hit her head, no numbness tingling or paresthesias. Neurologically intact no focal deficits. Skin: Skin is warm. She is not diaphoretic. No pallor. Psychiatric: She has a normal mood and affect. Her behavior is normal.   Nursing note and vitals reviewed.       MEDICAL DECISION MAKING    Vitals:    Vitals:    04/11/19 1339 04/11/19 1512 04/11/19 1603 04/11/19 1715   BP: (!) 183/123 (!) 176/108     Pulse: 60 66 64 66   Resp: 14 12 14 12   Temp: 98 °F (36.7 °C) 98 °F (36.7 °C) 98 °F (36.7 °C) 98 °F (36.7 °C)   TempSrc: Oral Oral Oral Oral   SpO2: 100% 100% 100% 100%   Weight: 157 lb (71.2 kg)      Height: 5' 6\" (1.676 m)          LABS:  Labs Reviewed   CBC WITH AUTO DIFFERENTIAL - Abnormal; Notable for the following components:       Result Value    Hematocrit 35.9 (*)     All other components Discharge 04/11/2019 05:27:44 PM      PATIENT REFERRED TO:  Joie De La Cruz  [address] [place] [address] [address] [place]  [phone]    Schedule an appointment as soon as possible for a visit in 1 day  Follow-up with your family doctor in 2 days for reevaluation    [facility]  ED  [facility] [address]  [phone]  Go to   If symptoms worsen      DISCHARGE MEDICATIONS:  New Prescriptions    METHOCARBAMOL (ROBAXIN) 500 MG TABLET    Take 1 tablet by mouth 3 times daily for 10 days    NAPROXEN (NAPROSYN) 500 MG TABLET    Take 1 tablet by mouth 2 times daily (with meals)       DISCONTINUED MEDICATIONS:  Discontinued Medications    CALCIUM CARBONATE (TUMS) 500 MG CHEWABLE TABLET    Take 1 tablet by mouth daily. OLMESARTAN MEDOXOMIL (BENICAR PO)    Take 40 mg by mouth daily.     VALSARTAN (DIOVAN) 320 MG TABLET    Take 320 mg by mouth daily              (Please note the MDM and HPI sections of this note were completed with a voice recognition program.  Efforts weremade to edit the dictations but occasionally words are mis-transcribed.)    Electronically signed, JUNG Thompson CNP,         JUNG Thompson CNP  04/11/19 9913

## 2022-10-20 LAB
ALBUMIN SERPL-MCNC: 4.1 G/DL (ref 3.5–5)
APTT: 30.1 SECONDS (ref 23.1–37.6)
BILIRUBIN URINE: NEGATIVE
CALCIUM OXALATE CRYSTALS: ABNORMAL /HPF
CLARITY: ABNORMAL
COLOR: YELLOW
EPITHELIAL CELLS, UA: 18 /HPF (ref 0–5)
ERYTHROCYTE SEDIMENTATION RATE: 20 MM/HR (ref 0–30)
ERYTHROCYTES URINE: NEGATIVE
ESTIMATED AVERAGE GLUCOSE: 117 MG/DL (ref 68–114)
GLUCOSE URINE: NEGATIVE MG/DL
HBA1C MFR BLD: 5.7 % (ref 4–5.6)
HYALINE CASTS: 10 /LPF (ref 0–2)
INR BLD: 0.9 (ref 0.9–1.1)
KETONES, URINE: NEGATIVE MG/DL
LEUKOCYTE ESTERASE, URINE: ABNORMAL
LEUKOCYTES, UA: 33 /HPF (ref 0–5)
MUCUS: PRESENT /HPF
NITRITE, URINE: NEGATIVE
PH UA: 6.5 (ref 5–8)
PROTEIN UA: 30 MG/DL
PROTHROMBIN TIME: 11.3 SECONDS (ref 10.3–13.9)
RBC UA: 30 /HPF (ref 0–3)
SPECIFIC GRAVITY UA: 1.03 (ref 1–1.03)
UROBILINOGEN, URINE: <2 MG/DL

## 2022-10-22 LAB — BACTERIA IDENTIFIED: NORMAL
